# Patient Record
Sex: MALE | Race: BLACK OR AFRICAN AMERICAN
[De-identification: names, ages, dates, MRNs, and addresses within clinical notes are randomized per-mention and may not be internally consistent; named-entity substitution may affect disease eponyms.]

---

## 2018-10-09 ENCOUNTER — HOSPITAL ENCOUNTER (EMERGENCY)
Dept: HOSPITAL 47 - EC | Age: 28
Discharge: HOME | End: 2018-10-09
Payer: COMMERCIAL

## 2018-10-09 VITALS
SYSTOLIC BLOOD PRESSURE: 125 MMHG | RESPIRATION RATE: 17 BRPM | HEART RATE: 87 BPM | TEMPERATURE: 98.4 F | DIASTOLIC BLOOD PRESSURE: 66 MMHG

## 2018-10-09 DIAGNOSIS — Z20.2: Primary | ICD-10-CM

## 2018-10-09 DIAGNOSIS — F17.200: ICD-10-CM

## 2018-10-09 LAB
HYALINE CASTS UR QL AUTO: 1 /LPF (ref 0–2)
PH UR: 5.5 [PH] (ref 5–8)
PROT UR QL: (no result)
RBC UR QL: <1 /HPF (ref 0–5)
SP GR UR: 1.02 (ref 1–1.03)
UROBILINOGEN UR QL STRIP: <2 MG/DL (ref ?–2)
WBC #/AREA URNS HPF: 3 /HPF (ref 0–5)

## 2018-10-09 PROCEDURE — 96372 THER/PROPH/DIAG INJ SC/IM: CPT

## 2018-10-09 PROCEDURE — 87491 CHLMYD TRACH DNA AMP PROBE: CPT

## 2018-10-09 PROCEDURE — 99283 EMERGENCY DEPT VISIT LOW MDM: CPT

## 2018-10-09 PROCEDURE — 81001 URINALYSIS AUTO W/SCOPE: CPT

## 2018-10-09 PROCEDURE — 87591 N.GONORRHOEAE DNA AMP PROB: CPT

## 2018-10-09 NOTE — ED
Male Urogenital HPI





- General


Chief complaint: Urogenital


Stated complaint: check for STDs


Time Seen by Provider: 10/09/18 22:05


Source: patient


Mode of arrival: ambulatory


Limitations: no limitations





- History of Present Illness


Initial comments: 


Heath is a previously healthy 28-year-old male who presents the emergency 

department for evaluation and treatment after his sexual partner advised him 

that she has tested positive for chlamydia.  Patient reports that he's been 

having no symptoms, he denies any penile lesions, pain, discharge.  He denies 

any dysuria or difficulty urinating.  He denies any history of sexual 

transmitted infections.  He denies any history of HIV.








- Related Data


 Home Medications











 Medication  Instructions  Recorded  Confirmed


 


No Known Home Medications  10/09/18 10/09/18











 Allergies











Allergy/AdvReac Type Severity Reaction Status Date / Time


 


No Known Allergies Allergy   Verified 10/09/18 21:59














Review of Systems


ROS Statement: 


Those systems with pertinent positive or pertinent negative responses have been 

documented in the HPI.





ROS Other: All systems not noted in ROS Statement are negative.





Past Medical History


Past Medical History: Asthma


History of Any Multi-Drug Resistant Organisms: None Reported


Past Surgical History: No Surgical Hx Reported


Past Psychological History: No Psychological Hx Reported


Smoking Status: Current every day smoker


Past Alcohol Use History: None Reported


Past Drug Use History: None Reported





General Exam





- General Exam Comments


Initial Comments: 


Physical Exam


GENERAL:


Patient is well-developed and well-nourished.  Patient is nontoxic and well-

hydrated and is in no distress.





HENT:


Normocephalic, Atraumatic. 





EYES:


PERRL, EOMI





PULMONARY:


Unlabored respirations.  





CARDIOVASCULAR:


There is a regular rate and rhythm without any murmurs gallops or rubs.  





ABDOMEN:


Soft and nontender with normal bowel sounds. 





SKIN:


Skin is clear with no lesions or rashes and otherwise unremarkable.





: 


Deferred





NEUROLOGIC:


Patient is alert and oriented x3.  Moving all extremities spontaneously





MUSCULOSKELETAL:


Normal extremities with adequate strength and full range of motion.  No lower 

extremity swelling or edema.  No calf tenderness.  





PSYCHIATRIC:


Normal psychiatric evaluation.  





Limitations: no limitations





Limitations: no limitations





Course





 Vital Signs











  10/09/18





  21:17


 


Temperature 98.4 F


 


Pulse Rate 87


 


Respiratory 17





Rate 


 


Blood Pressure 125/66


 


O2 Sat by Pulse 95





Oximetry 














Medical Decision Making





- Medical Decision Making


Urinalysis, testing and treatment for gonorrhea and chlamydia were ordered


Follow-up was discussed


Patient was discharged home





The patient was advised follow-up with the health department for HIV testing or 

return to the ER if he develops any signs or symptoms of sexual transmitted 

infection.








- Lab Data





 Lab Results











  10/09/18 Range/Units





  21:22 


 


Urine Color  Yellow  


 


Urine Appearance  Clear  (Clear)  


 


Urine pH  5.5  (5.0-8.0)  


 


Ur Specific Gravity  1.016  (1.001-1.035)  


 


Urine Protein  2+ H  (Negative)  


 


Urine Glucose (UA)  Negative  (Negative)  


 


Urine Ketones  Negative  (Negative)  


 


Urine Blood  Negative  (Negative)  


 


Urine Nitrite  Negative  (Negative)  


 


Urine Bilirubin  Negative  (Negative)  


 


Urine Urobilinogen  <2.0  (<2.0)  mg/dL


 


Ur Leukocyte Esterase  Negative  (Negative)  


 


Urine RBC  <1  (0-5)  /hpf


 


Urine WBC  3  (0-5)  /hpf


 


Hyaline Casts  1  (0-2)  /lpf


 


Urine Mucus  Rare H  (None)  /hpf














Disposition


Clinical Impression: 


 Exposure to STD





Disposition: HOME SELF-CARE


Condition: Good


Instructions:  Chlamydia (ED), Gonorrhea (ED), Safe Sex (ED)


Is patient prescribed a controlled substance at d/c from ED?: No


Referrals: 


None,Stated [Primary Care Provider] - 1-2 days


Time of Disposition: 22:11

## 2020-06-30 ENCOUNTER — HOSPITAL ENCOUNTER (EMERGENCY)
Dept: HOSPITAL 47 - EC | Age: 30
Discharge: HOME | End: 2020-06-30
Payer: COMMERCIAL

## 2020-06-30 VITALS
DIASTOLIC BLOOD PRESSURE: 90 MMHG | TEMPERATURE: 99.7 F | HEART RATE: 98 BPM | RESPIRATION RATE: 16 BRPM | SYSTOLIC BLOOD PRESSURE: 140 MMHG

## 2020-06-30 DIAGNOSIS — Z53.29: ICD-10-CM

## 2020-06-30 DIAGNOSIS — S01.511A: Primary | ICD-10-CM

## 2020-06-30 DIAGNOSIS — Z23: ICD-10-CM

## 2020-06-30 DIAGNOSIS — S09.93XA: ICD-10-CM
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
## 2020-06-30 DIAGNOSIS — F17.200: ICD-10-CM

## 2020-06-30 PROCEDURE — 12011 RPR F/E/E/N/L/M 2.5 CM/<: CPT

## 2020-06-30 PROCEDURE — 90715 TDAP VACCINE 7 YRS/> IM: CPT

## 2020-06-30 PROCEDURE — 90471 IMMUNIZATION ADMIN: CPT

## 2020-06-30 PROCEDURE — 96372 THER/PROPH/DIAG INJ SC/IM: CPT

## 2020-06-30 PROCEDURE — 99282 EMERGENCY DEPT VISIT SF MDM: CPT
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
## 2020-10-12 ENCOUNTER — HOSPITAL ENCOUNTER (EMERGENCY)
Dept: HOSPITAL 47 - EC | Age: 30
Discharge: HOME | End: 2020-10-12
Payer: COMMERCIAL

## 2020-10-12 VITALS
DIASTOLIC BLOOD PRESSURE: 91 MMHG | RESPIRATION RATE: 18 BRPM | SYSTOLIC BLOOD PRESSURE: 151 MMHG | HEART RATE: 66 BPM | TEMPERATURE: 97.8 F
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
## 2020-10-12 DIAGNOSIS — F17.200: ICD-10-CM

## 2020-10-12 DIAGNOSIS — S05.00XA: Primary | ICD-10-CM

## 2020-10-12 PROCEDURE — 99282 EMERGENCY DEPT VISIT SF MDM: CPT

## 2020-10-12 NOTE — ED
Eye Problem HPI





- General


Chief complaint: Eye Problems


Stated complaint: Eye Problems


Time Seen by Provider: 10/12/20 00:48


Source: patient


Mode of arrival: ambulatory


Limitations: no limitations





- History of Present Illness


Initial comments: 


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 

did wear safety goggles but reports some a sandwich around the goggles.  Since 

then he's had persistently worsening eye pain.  Patient states he feels like 

there is sand in both of his eyes.  Denies any vision changes but states that he

hasn't been willing to open his eyes for the past 2 hours secondary to the pain.

 He does not wear contacts.











- Related Data


                                  Previous Rx's











 Medication  Instructions  Recorded


 


Ibuprofen [Motrin] 600 mg PO Q8HR PRN #30 tab 06/30/20


 


Penicillin V Potassium [Pen Vee K] 500 mg PO Q6H #40 tablet 06/30/20











                                    Allergies











Allergy/AdvReac Type Severity Reaction Status Date / Time


 


No Known Allergies Allergy   Verified 10/12/20 00:43














Review of Systems


ROS Statement: 


Those systems with pertinent positive or pertinent negative responses have been 

documented in the HPI.





ROS Other: All systems not noted in ROS Statement are negative.





Past Medical History


Past Medical History: Asthma


History of Any Multi-Drug Resistant Organisms: None Reported


Past Surgical History: No Surgical Hx Reported


Past Psychological History: No Psychological Hx Reported


Smoking Status: Current every day smoker


Past Alcohol Use History: Occasional


Past Drug Use History: Marijuana





General Exam





- General Exam Comments


Initial Comments: 


Physical Exam


GENERAL:


Patient is well-developed and well-nourished.  


Patient is nontoxic and well-hydrated and is in no distress.





HENT:


Normocephalic, Atraumatic. 





EYES:


PERRL


Foreseen staining of the eyes reveals diffuse uptake bilaterally consistent with

a iritis


Negative Suhas sign, no obvious ulcers





PULMONARY:


Unlabored respirations.  





CARDIOVASCULAR:


RRR


Warm and well perfused extremities





ABDOMEN:


Non-distended





SKIN:


No rashes or bruising 





: 


Deferred





NEUROLOGIC:


Alert and oriented


Normal speech


Normal gait





MUSCULOSKELETAL:


Moving all extremities with no apparent injury 





PSYCHIATRIC:


No SI/HI





Limitations: no limitations





Course


                                   Vital Signs











  10/12/20





  00:40


 


Temperature 97.8 F


 


Pulse Rate 66


 


Respiratory 18





Rate 


 


Blood Pressure 151/91


 


O2 Sat by Pulse 100





Oximetry 














Medical Decision Making





- Medical Decision Making


The patient was seen and evaluated history is obtained from patient


Patient's eyes were anesthetized with proparacaine drops patient reported 

significant improvement in his discomfort after this





Despite reporting resolution of his discomfort patient was still minimally 

participatory in exam


For seen staining reveals diffuse uptake bilaterally no obvious ulcers negative 

Suhas sign





Patient will be treated with erythromycin ointment, the patient and his 

girlfriend were advised that he needs to follow Dr. Luevano tomorrow for 

reevaluation


Return parameters were discussed patient was discharged home in stable condition








Disposition


Clinical Impression: 


 Corneal abrasion





Disposition: HOME SELF-CARE


Condition: Stable


Instructions (If sedation given, give patient instructions):  Corneal Abrasion 

(ED)


Is patient prescribed a controlled substance at d/c from ED?: No


Referrals: 


None,Stated [Primary Care Provider] - 1-2 days


Tyree Luevano MD [STAFF PHYSICIAN] - 1-2 days

## 2020-12-24 ENCOUNTER — HOSPITAL ENCOUNTER (EMERGENCY)
Dept: HOSPITAL 47 - EC | Age: 30
Discharge: HOME | End: 2020-12-24
Payer: COMMERCIAL

## 2020-12-24 VITALS
SYSTOLIC BLOOD PRESSURE: 154 MMHG | RESPIRATION RATE: 18 BRPM | DIASTOLIC BLOOD PRESSURE: 98 MMHG | HEART RATE: 79 BPM | TEMPERATURE: 98.3 F

## 2020-12-24 DIAGNOSIS — R10.817: ICD-10-CM

## 2020-12-24 DIAGNOSIS — R10.9: ICD-10-CM

## 2020-12-24 DIAGNOSIS — R14.0: ICD-10-CM

## 2020-12-24 DIAGNOSIS — N28.9: Primary | ICD-10-CM

## 2020-12-24 DIAGNOSIS — F17.200: ICD-10-CM

## 2020-12-24 LAB
ALBUMIN SERPL-MCNC: 4.2 G/DL (ref 3.5–5)
ALP SERPL-CCNC: 95 U/L (ref 38–126)
ALT SERPL-CCNC: 88 U/L (ref 4–49)
ANION GAP SERPL CALC-SCNC: 6 MMOL/L
AST SERPL-CCNC: 59 U/L (ref 17–59)
BASOPHILS # BLD AUTO: 0 K/UL (ref 0–0.2)
BASOPHILS NFR BLD AUTO: 1 %
BUN SERPL-SCNC: 21 MG/DL (ref 9–20)
CALCIUM SPEC-MCNC: 9.6 MG/DL (ref 8.4–10.2)
CHLORIDE SERPL-SCNC: 103 MMOL/L (ref 98–107)
CO2 SERPL-SCNC: 27 MMOL/L (ref 22–30)
EOSINOPHIL # BLD AUTO: 0.1 K/UL (ref 0–0.7)
EOSINOPHIL NFR BLD AUTO: 5 %
ERYTHROCYTE [DISTWIDTH] IN BLOOD BY AUTOMATED COUNT: 4.84 M/UL (ref 4.3–5.9)
ERYTHROCYTE [DISTWIDTH] IN BLOOD: 13.3 % (ref 11.5–15.5)
GLUCOSE SERPL-MCNC: 99 MG/DL (ref 74–99)
HCT VFR BLD AUTO: 46.1 % (ref 39–53)
HGB BLD-MCNC: 16.6 GM/DL (ref 13–17.5)
LIPASE SERPL-CCNC: 191 U/L (ref 23–300)
LYMPHOCYTES # SPEC AUTO: 1.1 K/UL (ref 1–4.8)
LYMPHOCYTES NFR SPEC AUTO: 38 %
MCH RBC QN AUTO: 34.4 PG (ref 25–35)
MCHC RBC AUTO-ENTMCNC: 36.1 G/DL (ref 31–37)
MCV RBC AUTO: 95.3 FL (ref 80–100)
MONOCYTES # BLD AUTO: 0.3 K/UL (ref 0–1)
MONOCYTES NFR BLD AUTO: 9 %
NEUTROPHILS # BLD AUTO: 1.3 K/UL (ref 1.3–7.7)
NEUTROPHILS NFR BLD AUTO: 44 %
PH UR: 6 [PH] (ref 5–8)
PLATELET # BLD AUTO: 214 K/UL (ref 150–450)
POTASSIUM SERPL-SCNC: 4 MMOL/L (ref 3.5–5.1)
PROT SERPL-MCNC: 7.2 G/DL (ref 6.3–8.2)
PROT UR QL: (no result)
RBC UR QL: 1 /HPF (ref 0–5)
SODIUM SERPL-SCNC: 136 MMOL/L (ref 137–145)
SP GR UR: 1.03 (ref 1–1.03)
UROBILINOGEN UR QL STRIP: 2 MG/DL (ref ?–2)
WBC # BLD AUTO: 3 K/UL (ref 3.8–10.6)
WBC # UR AUTO: 1 /HPF (ref 0–5)

## 2020-12-24 PROCEDURE — 99284 EMERGENCY DEPT VISIT MOD MDM: CPT

## 2020-12-24 PROCEDURE — 81001 URINALYSIS AUTO W/SCOPE: CPT

## 2020-12-24 PROCEDURE — 96360 HYDRATION IV INFUSION INIT: CPT

## 2020-12-24 PROCEDURE — 80053 COMPREHEN METABOLIC PANEL: CPT

## 2020-12-24 PROCEDURE — 83605 ASSAY OF LACTIC ACID: CPT

## 2020-12-24 PROCEDURE — 83690 ASSAY OF LIPASE: CPT

## 2020-12-24 PROCEDURE — 36415 COLL VENOUS BLD VENIPUNCTURE: CPT

## 2020-12-24 PROCEDURE — 85025 COMPLETE CBC W/AUTO DIFF WBC: CPT

## 2020-12-24 PROCEDURE — 74018 RADEX ABDOMEN 1 VIEW: CPT

## 2020-12-24 NOTE — XR
EXAM:

  XR Abdomen, 2 Views

 

CLINICAL HISTORY:

  Abdominal pain.

 

TECHNIQUE:

  Frontal view of the abdomen/pelvis with upright view of the abdomen.

 

COMPARISON:

  No relevant prior studies available.

 

FINDINGS:

No evidence of small bowel obstruction or free intraperitoneal air.

 

No organomegaly or abnormal mass-effect.

 

No pathologic calcifications.  No acute abnormalities noted of the bones.

 

IMPRESSION:     

Negative abdominal x-rays.

## 2020-12-24 NOTE — ED
Abdominal Pain HPI





- General


Chief Complaint: Abdominal Pain


Stated Complaint: Constipation


Time Seen by Provider: 12/24/20 00:29


Source: patient


Mode of arrival: ambulatory


Limitations: no limitations





- History of Present Illness


Initial Comments: 


30-year-old male patient presents to the emergency department today for 

evaluation of abdominal pain and bloating.  Patient states his been having 

issues with constipation over the last couple of weeks.  Patient states he did 

take powder laxative yesterday did have a small bowel movement this morning 

which did relieve some pressure but states the pressure built back up throughout

the day. He denies any nausea or vomiting. States he is able to eat. Denies any 

fever or chills. Denies history of abdominal surgery.  Patient denies any recent

rash, fever, chills, cough, shortness of breath, chest pain, back pain, 

numbness, tingling, dizziness, weakness, hematuria, dysuria, urinary urgency, 

urinary frequency, headache, visual changes, or any other complaints.





- Related Data


                                  Previous Rx's











 Medication  Instructions  Recorded


 


Ibuprofen [Motrin] 600 mg PO Q8HR PRN #30 tab 06/30/20


 


Penicillin V Potassium [Pen Vee K] 500 mg PO Q6H #40 tablet 06/30/20











                                    Allergies











Allergy/AdvReac Type Severity Reaction Status Date / Time


 


No Known Allergies Allergy   Verified 12/24/20 00:28














Review of Systems


ROS Statement: 


Those systems with pertinent positive or pertinent negative responses have been 

documented in the HPI.





ROS Other: All systems not noted in ROS Statement are negative.





Past Medical History


Past Medical History: Asthma


History of Any Multi-Drug Resistant Organisms: None Reported


Past Surgical History: No Surgical Hx Reported


Past Psychological History: No Psychological Hx Reported


Smoking Status: Current every day smoker


Past Alcohol Use History: Occasional


Past Drug Use History: Marijuana





General Exam


Limitations: no limitations


General appearance: alert, in no apparent distress, other (Physical well-

developed, well-nourished adult male patient in no acute distress.  Vital signs 

upon presentation temperature 98.3F, pulse 79, respirations 18, blood pressure 

154/98, pulse ox 98% on room air.)


Respiratory exam: Present: normal lung sounds bilaterally.  Absent: respiratory 

distress, wheezes, rales, rhonchi, stridor


Cardiovascular Exam: Present: regular rate, normal rhythm, normal heart sounds. 

Absent: systolic murmur, diastolic murmur, rubs, gallop, clicks


GI/Abdominal exam: Present: soft, distended, normal bowel sounds.  Absent: 

tenderness, guarding, rebound, rigid


Neurological exam: Present: alert, oriented X3, CN II-XII intact


Psychiatric exam: Present: normal affect, normal mood


Skin exam: Present: warm, dry, intact, normal color.  Absent: rash





Course


                                   Vital Signs











  12/24/20





  00:24


 


Temperature 98.3 F


 


Pulse Rate 79


 


Respiratory 18





Rate 


 


Blood Pressure 154/98


 


O2 Sat by Pulse 98





Oximetry 














Medical Decision Making





- Medical Decision Making


30-year-old male patient presented to the emergency department today with 

complaints of abdominal pain and bloating.  Patient states he hasn't had a good 

bowel movement for the last 2 weeks.  Physical examination did reveal mild 

generalized abdominal tenderness and distention.  Labs reviewed and revealed 

white blood cell count at 3.0.  BUN 21, creatinine 1.57.  There was protein in 

the urine.  I did discuss findings and results with the patient.  We did discuss

his renal function and that he should have this redrawn by his primary care 

physician and have further evaluation if his numbers do not improve.  A primary 

care physician was recommended to him.  He is given a bottle of magnesium 

citrate to aid with bowel movements.  He is instructed to increase fluid, 

physical activity and fibrous diet.  Return parameters were discussed in detail.

 He verbalizes understanding and agrees with this plan.








- Lab Data


Result diagrams: 


                                 12/24/20 00:50





                                 12/24/20 00:50


                                   Lab Results











  12/24/20 12/24/20 12/24/20 Range/Units





  00:50 00:50 00:50 


 


WBC  3.0 L    (3.8-10.6)  k/uL


 


RBC  4.84    (4.30-5.90)  m/uL


 


Hgb  16.6    (13.0-17.5)  gm/dL


 


Hct  46.1    (39.0-53.0)  %


 


MCV  95.3    (80.0-100.0)  fL


 


MCH  34.4    (25.0-35.0)  pg


 


MCHC  36.1    (31.0-37.0)  g/dL


 


RDW  13.3    (11.5-15.5)  %


 


Plt Count  214    (150-450)  k/uL


 


MPV  7.4    


 


Neutrophils %  44    %


 


Lymphocytes %  38    %


 


Monocytes %  9    %


 


Eosinophils %  5    %


 


Basophils %  1    %


 


Neutrophils #  1.3    (1.3-7.7)  k/uL


 


Lymphocytes #  1.1    (1.0-4.8)  k/uL


 


Monocytes #  0.3    (0-1.0)  k/uL


 


Eosinophils #  0.1    (0-0.7)  k/uL


 


Basophils #  0.0    (0-0.2)  k/uL


 


Sodium   136 L   (137-145)  mmol/L


 


Potassium   4.0   (3.5-5.1)  mmol/L


 


Chloride   103   ()  mmol/L


 


Carbon Dioxide   27   (22-30)  mmol/L


 


Anion Gap   6   mmol/L


 


BUN   21 H   (9-20)  mg/dL


 


Creatinine   1.57 H   (0.66-1.25)  mg/dL


 


Est GFR (CKD-EPI)AfAm   69   (>60 ml/min/1.73 sqM)  


 


Est GFR (CKD-EPI)NonAf   59   (>60 ml/min/1.73 sqM)  


 


Glucose   99   (74-99)  mg/dL


 


Plasma Lactic Acid Liborio    0.7  (0.7-2.0)  mmol/L


 


Calcium   9.6   (8.4-10.2)  mg/dL


 


Total Bilirubin   0.7   (0.2-1.3)  mg/dL


 


AST   59   (17-59)  U/L


 


ALT   88 H   (4-49)  U/L


 


Alkaline Phosphatase   95   ()  U/L


 


Total Protein   7.2   (6.3-8.2)  g/dL


 


Albumin   4.2   (3.5-5.0)  g/dL


 


Lipase   191   ()  U/L


 


Urine Color     


 


Urine Appearance     (Clear)  


 


Urine pH     (5.0-8.0)  


 


Ur Specific Gravity     (1.001-1.035)  


 


Urine Protein     (Negative)  


 


Urine Glucose (UA)     (Negative)  


 


Urine Ketones     (Negative)  


 


Urine Blood     (Negative)  


 


Urine Nitrite     (Negative)  


 


Urine Bilirubin     (Negative)  


 


Urine Urobilinogen     (<2.0)  mg/dL


 


Ur Leukocyte Esterase     (Negative)  


 


Urine RBC     (0-5)  /hpf


 


Urine WBC     (0-5)  /hpf


 


Urine Mucus     (None)  /hpf














  12/24/20 Range/Units





  01:26 


 


WBC   (3.8-10.6)  k/uL


 


RBC   (4.30-5.90)  m/uL


 


Hgb   (13.0-17.5)  gm/dL


 


Hct   (39.0-53.0)  %


 


MCV   (80.0-100.0)  fL


 


MCH   (25.0-35.0)  pg


 


MCHC   (31.0-37.0)  g/dL


 


RDW   (11.5-15.5)  %


 


Plt Count   (150-450)  k/uL


 


MPV   


 


Neutrophils %   %


 


Lymphocytes %   %


 


Monocytes %   %


 


Eosinophils %   %


 


Basophils %   %


 


Neutrophils #   (1.3-7.7)  k/uL


 


Lymphocytes #   (1.0-4.8)  k/uL


 


Monocytes #   (0-1.0)  k/uL


 


Eosinophils #   (0-0.7)  k/uL


 


Basophils #   (0-0.2)  k/uL


 


Sodium   (137-145)  mmol/L


 


Potassium   (3.5-5.1)  mmol/L


 


Chloride   ()  mmol/L


 


Carbon Dioxide   (22-30)  mmol/L


 


Anion Gap   mmol/L


 


BUN   (9-20)  mg/dL


 


Creatinine   (0.66-1.25)  mg/dL


 


Est GFR (CKD-EPI)AfAm   (>60 ml/min/1.73 sqM)  


 


Est GFR (CKD-EPI)NonAf   (>60 ml/min/1.73 sqM)  


 


Glucose   (74-99)  mg/dL


 


Plasma Lactic Acid Liborio   (0.7-2.0)  mmol/L


 


Calcium   (8.4-10.2)  mg/dL


 


Total Bilirubin   (0.2-1.3)  mg/dL


 


AST   (17-59)  U/L


 


ALT   (4-49)  U/L


 


Alkaline Phosphatase   ()  U/L


 


Total Protein   (6.3-8.2)  g/dL


 


Albumin   (3.5-5.0)  g/dL


 


Lipase   ()  U/L


 


Urine Color  Yellow  


 


Urine Appearance  Clear  (Clear)  


 


Urine pH  6.0  (5.0-8.0)  


 


Ur Specific Gravity  1.027  (1.001-1.035)  


 


Urine Protein  3+ H  (Negative)  


 


Urine Glucose (UA)  Negative  (Negative)  


 


Urine Ketones  Negative  (Negative)  


 


Urine Blood  Trace H  (Negative)  


 


Urine Nitrite  Negative  (Negative)  


 


Urine Bilirubin  Negative  (Negative)  


 


Urine Urobilinogen  2.0  (<2.0)  mg/dL


 


Ur Leukocyte Esterase  Negative  (Negative)  


 


Urine RBC  1  (0-5)  /hpf


 


Urine WBC  1  (0-5)  /hpf


 


Urine Mucus  Rare H  (None)  /hpf














- Radiology Data


Radiology results: report reviewed, image reviewed


KUB x-ray was obtained.  Report is reviewed in its entirety.  Impression by Dr. Esparza shows negative abdominal x-rays.





Disposition


Clinical Impression: 


 Abdominal pain, Renal insufficiency





Disposition: HOME SELF-CARE


Condition: Good


Instructions (If sedation given, give patient instructions):  Constipation (ED),

Abdominal Pain (ED)


Additional Instructions: 


Drink one half bottle of mag citrate if he has not had a bowel movement in 12 

hours drinking other half.  Follow-up with the primary care physician for 

recheck in 1-2 days.  Discuss your renal function with them and have repeat 

renal function labs performed.  Return to the emergency department for any new, 

worsening, or concerning symptoms.


Is patient prescribed a controlled substance at d/c from ED?: No


Referrals: 


Neymar Johnson [STAFF PHYSICIAN] - 1-2 days


Time of Disposition: 01:53

## 2022-12-29 ENCOUNTER — HOSPITAL ENCOUNTER (EMERGENCY)
Dept: HOSPITAL 47 - EC | Age: 32
Discharge: HOME | End: 2022-12-29
Payer: COMMERCIAL

## 2022-12-29 VITALS
TEMPERATURE: 98 F | HEART RATE: 100 BPM | RESPIRATION RATE: 20 BRPM | DIASTOLIC BLOOD PRESSURE: 82 MMHG | SYSTOLIC BLOOD PRESSURE: 158 MMHG

## 2022-12-29 DIAGNOSIS — F17.200: ICD-10-CM

## 2022-12-29 DIAGNOSIS — U07.1: Primary | ICD-10-CM

## 2022-12-29 DIAGNOSIS — J45.909: ICD-10-CM

## 2022-12-29 DIAGNOSIS — F12.90: ICD-10-CM

## 2022-12-29 LAB
ALBUMIN SERPL-MCNC: 4.9 G/DL (ref 3.5–5)
ALP SERPL-CCNC: 87 U/L (ref 38–126)
ALT SERPL-CCNC: 58 U/L (ref 4–49)
ANION GAP SERPL CALC-SCNC: 10 MMOL/L
AST SERPL-CCNC: 31 U/L (ref 17–59)
BASOPHILS # BLD AUTO: 0.1 K/UL (ref 0–0.2)
BASOPHILS NFR BLD AUTO: 2 %
BUN SERPL-SCNC: 12 MG/DL (ref 9–20)
CALCIUM SPEC-MCNC: 9.9 MG/DL (ref 8.4–10.2)
CHLORIDE SERPL-SCNC: 105 MMOL/L (ref 98–107)
CO2 SERPL-SCNC: 24 MMOL/L (ref 22–30)
EOSINOPHIL # BLD AUTO: 0.2 K/UL (ref 0–0.7)
EOSINOPHIL NFR BLD AUTO: 2 %
ERYTHROCYTE [DISTWIDTH] IN BLOOD BY AUTOMATED COUNT: 5.06 M/UL (ref 4.3–5.9)
ERYTHROCYTE [DISTWIDTH] IN BLOOD: 12.8 % (ref 11.5–15.5)
GLUCOSE SERPL-MCNC: 141 MG/DL (ref 74–99)
HCT VFR BLD AUTO: 47.7 % (ref 39–53)
HGB BLD-MCNC: 16.8 GM/DL (ref 13–17.5)
LIPASE SERPL-CCNC: 123 U/L (ref 23–300)
LYMPHOCYTES # SPEC AUTO: 0.3 K/UL (ref 1–4.8)
LYMPHOCYTES NFR SPEC AUTO: 4 %
MCH RBC QN AUTO: 33.2 PG (ref 25–35)
MCHC RBC AUTO-ENTMCNC: 35.3 G/DL (ref 31–37)
MCV RBC AUTO: 94.2 FL (ref 80–100)
MONOCYTES # BLD AUTO: 0.7 K/UL (ref 0–1)
MONOCYTES NFR BLD AUTO: 8 %
NEUTROPHILS # BLD AUTO: 6.8 K/UL (ref 1.3–7.7)
NEUTROPHILS NFR BLD AUTO: 83 %
PLATELET # BLD AUTO: 231 K/UL (ref 150–450)
POTASSIUM SERPL-SCNC: 3.8 MMOL/L (ref 3.5–5.1)
PROT SERPL-MCNC: 8.1 G/DL (ref 6.3–8.2)
SODIUM SERPL-SCNC: 139 MMOL/L (ref 137–145)
WBC # BLD AUTO: 8.1 K/UL (ref 3.8–10.6)

## 2022-12-29 PROCEDURE — 87502 INFLUENZA DNA AMP PROBE: CPT

## 2022-12-29 PROCEDURE — 83690 ASSAY OF LIPASE: CPT

## 2022-12-29 PROCEDURE — 36415 COLL VENOUS BLD VENIPUNCTURE: CPT

## 2022-12-29 PROCEDURE — 99284 EMERGENCY DEPT VISIT MOD MDM: CPT

## 2022-12-29 PROCEDURE — 85025 COMPLETE CBC W/AUTO DIFF WBC: CPT

## 2022-12-29 PROCEDURE — 87635 SARS-COV-2 COVID-19 AMP PRB: CPT

## 2022-12-29 PROCEDURE — 74018 RADEX ABDOMEN 1 VIEW: CPT

## 2022-12-29 PROCEDURE — 83605 ASSAY OF LACTIC ACID: CPT

## 2022-12-29 PROCEDURE — 80053 COMPREHEN METABOLIC PANEL: CPT

## 2022-12-29 NOTE — XR
EXAMINATION TYPE: XR KUB

 

DATE OF EXAM: 12/29/2022

 

COMPARISON: 12/24/2020

 

HISTORY: Epigastric pain

 

TECHNIQUE: 2 views

Upper

FINDINGS: There is no sign of intestinal obstruction or pneumoperitoneum. Fecal pattern is normal. No
 evidence of a mass. No calcification seen over the kidneys.

 

IMPRESSION: Nonacute abdomen. No change.

## 2022-12-29 NOTE — ED
Abdominal Pain HPI





- General


Source: patient


Mode of arrival: ambulatory


Limitations: no limitations





<Aura Salas - Last Filed: 12/29/22 17:30>





- General


Source: RN notes reviewed





<Johnna Jacobs - Last Filed: 12/29/22 20:31>





- General


Chief Complaint: Abdominal Pain


Stated Complaint: Back/abd pain





- History of Present Illness


Initial Comments: 


Patient is a 32-year-old male who presents to the emergency department with a 

chief complaint abdominal pain.  Pain started this morning when patient woke up.

 Describes as generalized cramping and bloating with radiation to his lower 

back.  He denies injury to his back.  Patient also reports nausea, headache, 

chills, dry cough.  Denies other URI symptoms, vomiting, diarrhea, blood in 

stool, burning with urination, trouble urinating, blood in urine. No history of 

abdominal surgery. Last bowel movement this morning, nonbloody.  Patient does 

admit to drinking a pint of liquor last night.  States he typically drinks twice

a month which usually includes liquor.  Denies past and present use of tobacco. 

Admits to daily marijuana use.


 (Aura Salas)





- Related Data


                                Home Medications











 Medication  Instructions  Recorded  Confirmed


 


No Known Home Medications  12/29/22 12/29/22











                                    Allergies











Allergy/AdvReac Type Severity Reaction Status Date / Time


 


No Known Allergies Allergy   Verified 12/29/22 20:15














Review of Systems


ROS Other: All systems not noted in ROS Statement are negative.





<Aura Salas - Last Filed: 12/29/22 17:30>


ROS Other: All systems not noted in ROS Statement are negative.





<Johnna Jacobs - Last Filed: 12/29/22 20:31>


ROS Statement: 


Those systems with pertinent positive or pertinent negative responses have been 

documented in the HPI.








Past Medical History


Past Medical History: Asthma


History of Any Multi-Drug Resistant Organisms: None Reported


Past Surgical History: No Surgical Hx Reported


Past Psychological History: No Psychological Hx Reported


Smoking Status: Current every day smoker


Past Alcohol Use History: Occasional


Past Drug Use History: Marijuana





<Aura Salas - Last Filed: 12/29/22 17:30>





General Exam


Limitations: no limitations





<Aura Salas - Last Filed: 12/29/22 17:30>


General appearance: alert, in no apparent distress


Head exam: Present: atraumatic, normocephalic, normal inspection


Eye exam: Present: normal appearance, PERRL, EOMI.  Absent: scleral icterus, 

conjunctival injection, periorbital swelling


ENT exam: Present: normal exam, mucous membranes moist


Neck exam: Present: normal inspection


Respiratory exam: Present: normal lung sounds bilaterally.  Absent: respiratory 

distress, wheezes, rales, rhonchi, stridor


Cardiovascular Exam: Present: regular rate, normal rhythm, normal heart sounds. 

Absent: systolic murmur, diastolic murmur, rubs, gallop, clicks


GI/Abdominal exam: Present: soft, normal bowel sounds.  Absent: distended, 

tenderness, guarding, rebound, rigid


Extremities exam: Present: normal inspection, full ROM, normal capillary refill.

 Absent: tenderness, pedal edema, joint swelling, calf tenderness


Back exam: Present: normal inspection


Neurological exam: Present: alert, oriented X3, CN II-XII intact


Psychiatric exam: Present: normal affect, normal mood


Skin exam: Present: warm, dry, intact, normal color.  Absent: rash





<Johnna Jacobs - Last Filed: 12/29/22 20:31>





Course





<Johnna Jacobs - Last Filed: 12/29/22 20:31>


                                   Vital Signs











  12/29/22





  16:03


 


Temperature 98 F


 


Pulse Rate 100


 


Respiratory 20





Rate 


 


Blood Pressure 158/82


 


O2 Sat by Pulse 100





Oximetry 














- Reevaluation(s)


Reevaluation #1: 


 I reviewed the above note and assumed care of the patient upon room placement i

n the main emergency department. I discussed the results with the patient, all 

questions were addressed. Patient agreeable with plan for discharge. 


12/29/22 20:26


 (Johnna Jacobs)





Medical Decision Making





- Lab Data


Result diagrams: 


                                 12/29/22 18:32





                                 12/29/22 18:32





<Johnna Jacobs - Last Filed: 12/29/22 20:31>





- Medical Decision Making





Was pt. sent in by a medical professional or institution?


@  -Self 


Did you speak to anyone other than the patient for history?  


@  -patient 


Did you review nursing and triage notes? 


@  -Yes, triage notes reviewed and agree 


Were old charts reviewed? 


@  -No 


Differential Diagnosis? 


@  -SBO, COVID, nausea and vomiting 


EKG interpreted by me (3pts min.)?


@  -[none]


X-rays interpreted by me (1pt min.)?


@  no evidence of abdominal process 


CT interpreted by me (1pt min.)?


@  -[none]


U/S interpreted by me (1pt. min.)?


@  -[none]


What testing was considered but not performed? (CT, X-rays, U/S, labs)? Why?


@  [CT, X-rays, U/S, labs? Why?]


What meds were considered but not given? Why?


@  -Pt vital signs stable and remains a-febrile while in the ED 


Did you discuss the management of the patient with other professionals?


@  -I discussed the case with Dr. Livingston who agrees with the plan for discharge 


Did you reconcile home meds?


@  -[none]


Was smoking cessation discussed for >3mins.?


@  -[none]


Was critical care preformed (if so, how long)?


@  -[none]


Were there social determinants of health that impacted care today? How? (Lisa

elessness, low income, unemployed, alcoholism, drug addiction, transportation, 

low edu. Level, literacy, decrease access to med. care, detention, rehab)?


@  -NA


Was there de-escalation of care discussed even if they declined? (Discuss DNR or

withdrawal of care, Hospice)?


@  -NA


What co-morbidities impacted this encounter? (DM, HTN, Smoking, COPD, CAD, 

Cancer, CVA, Hep., AIDS, mental health diagnosis, sleep apnea, morbid obesity)?


@  -NA


Was patient admitted / discharged?


@  -discharged in stable condition  


Undiagnosed new problem with uncertain prognosis?


@  -NO 


Drug Therapy requiring intensive monitoring for toxicity (Heparin, Nitro, 

Insulin, Cardizem)?


@  -NA


Were any procedures done?


@  -NA


Diagnosis/symptom?


@  -COVID19 


Acute, or Chronic, or Acute on Chronic?


@  -acute 


Uncomplicated (without systemic symptoms) or Complicated (systemic symptoms)?


@  -uncomplicated 


Side effects of treatment?


@  -NA 


Exacerbation, Progression, or Severe Exacerbation]


@  -NA 


Poses a threat to life or bodily function?


@  -low likelihood  (Johnna Jacobs)





- Lab Data


                                   Lab Results











  12/29/22 12/29/22 12/29/22 Range/Units





  18:32 18:32 18:32 


 


WBC  8.1    (3.8-10.6)  k/uL


 


RBC  5.06    (4.30-5.90)  m/uL


 


Hgb  16.8    (13.0-17.5)  gm/dL


 


Hct  47.7    (39.0-53.0)  %


 


MCV  94.2    (80.0-100.0)  fL


 


MCH  33.2    (25.0-35.0)  pg


 


MCHC  35.3    (31.0-37.0)  g/dL


 


RDW  12.8    (11.5-15.5)  %


 


Plt Count  231    (150-450)  k/uL


 


MPV  8.1    


 


Neutrophils %  83    %


 


Lymphocytes %  4    %


 


Monocytes %  8    %


 


Eosinophils %  2    %


 


Basophils %  2    %


 


Neutrophils #  6.8    (1.3-7.7)  k/uL


 


Lymphocytes #  0.3 L    (1.0-4.8)  k/uL


 


Monocytes #  0.7    (0-1.0)  k/uL


 


Eosinophils #  0.2    (0-0.7)  k/uL


 


Basophils #  0.1    (0-0.2)  k/uL


 


Sodium   139   (137-145)  mmol/L


 


Potassium   3.8   (3.5-5.1)  mmol/L


 


Chloride   105   ()  mmol/L


 


Carbon Dioxide   24   (22-30)  mmol/L


 


Anion Gap   10   mmol/L


 


BUN   12   (9-20)  mg/dL


 


Creatinine   1.50 H   (0.66-1.25)  mg/dL


 


Est GFR (CKD-EPI)AfAm   72   (>60 ml/min/1.73 sqM)  


 


Est GFR (CKD-EPI)NonAf   62   (>60 ml/min/1.73 sqM)  


 


Glucose   141 H   (74-99)  mg/dL


 


Plasma Lactic Acid Liborio    1.3  (0.7-2.0)  mmol/L


 


Calcium   9.9   (8.4-10.2)  mg/dL


 


Total Bilirubin   0.9   (0.2-1.3)  mg/dL


 


AST   31   (17-59)  U/L


 


ALT   58 H   (4-49)  U/L


 


Alkaline Phosphatase   87   ()  U/L


 


Total Protein   8.1   (6.3-8.2)  g/dL


 


Albumin   4.9   (3.5-5.0)  g/dL


 


Lipase   123   ()  U/L


 


Coronavirus (PCR)     (Not Detectd)  


 


Influenza Type A RNA     (Not Detectd)  


 


Influenza Type B (PCR)     (Not Detectd)  














  12/29/22 12/29/22 Range/Units





  18:32 18:32 


 


WBC    (3.8-10.6)  k/uL


 


RBC    (4.30-5.90)  m/uL


 


Hgb    (13.0-17.5)  gm/dL


 


Hct    (39.0-53.0)  %


 


MCV    (80.0-100.0)  fL


 


MCH    (25.0-35.0)  pg


 


MCHC    (31.0-37.0)  g/dL


 


RDW    (11.5-15.5)  %


 


Plt Count    (150-450)  k/uL


 


MPV    


 


Neutrophils %    %


 


Lymphocytes %    %


 


Monocytes %    %


 


Eosinophils %    %


 


Basophils %    %


 


Neutrophils #    (1.3-7.7)  k/uL


 


Lymphocytes #    (1.0-4.8)  k/uL


 


Monocytes #    (0-1.0)  k/uL


 


Eosinophils #    (0-0.7)  k/uL


 


Basophils #    (0-0.2)  k/uL


 


Sodium    (137-145)  mmol/L


 


Potassium    (3.5-5.1)  mmol/L


 


Chloride    ()  mmol/L


 


Carbon Dioxide    (22-30)  mmol/L


 


Anion Gap    mmol/L


 


BUN    (9-20)  mg/dL


 


Creatinine    (0.66-1.25)  mg/dL


 


Est GFR (CKD-EPI)AfAm    (>60 ml/min/1.73 sqM)  


 


Est GFR (CKD-EPI)NonAf    (>60 ml/min/1.73 sqM)  


 


Glucose    (74-99)  mg/dL


 


Plasma Lactic Acid Liborio    (0.7-2.0)  mmol/L


 


Calcium    (8.4-10.2)  mg/dL


 


Total Bilirubin    (0.2-1.3)  mg/dL


 


AST    (17-59)  U/L


 


ALT    (4-49)  U/L


 


Alkaline Phosphatase    ()  U/L


 


Total Protein    (6.3-8.2)  g/dL


 


Albumin    (3.5-5.0)  g/dL


 


Lipase    ()  U/L


 


Coronavirus (PCR)   Detected A  (Not Detectd)  


 


Influenza Type A RNA  Not Detected   (Not Detectd)  


 


Influenza Type B (PCR)  Not Detected   (Not Detectd)  














Disposition





<Aura Salas - Last Filed: 12/29/22 17:30>


Is patient prescribed a controlled substance at d/c from ED?: No


Time of Disposition: 20:21





<Johnna Jacobs - Last Filed: 12/29/22 20:31>


Clinical Impression: 


 COVID-19





Disposition: HOME SELF-CARE


Condition: Stable


Additional Instructions: 


Please return to the nearest emergency department if symptoms worsen or persist.


Referrals: 


None,Stated [Primary Care Provider] - 1-2 days

## 2025-03-04 ENCOUNTER — HOSPITAL ENCOUNTER (OUTPATIENT)
Dept: HOSPITAL 47 - RADUSWWP | Age: 35
Discharge: HOME | End: 2025-03-04
Attending: FAMILY MEDICINE
Payer: COMMERCIAL

## 2025-03-04 DIAGNOSIS — N18.2: Primary | ICD-10-CM

## 2025-03-04 DIAGNOSIS — R86.9: ICD-10-CM

## 2025-03-04 PROCEDURE — 76770 US EXAM ABDO BACK WALL COMP: CPT

## 2025-03-04 NOTE — US
EXAMINATION TYPE: US kidneys/renal and bladder

 

DATE OF EXAM: 3/4/2025

 

COMPARISON: [facility] radiograph 12/29/2022

 

CLINICAL INDICATION: Male, 34 years old with history of N18.2 CHRONIC KIDNEY DISEASE, STAGE 2 (MILD);


 

TECHNIQUE: Grayscale imaging of the bilateral kidneys and urinary bladder:

 

FINDINGS:

 

EXAM MEASUREMENTS:

 

Right Kidney:  9.6 x 4.5 x 5.2 cm

Left Kidney: 9.8 x 5.2 x 5.5 cm

 

 

 

Right Kidney: visualized portions wnl, inferior pole limited by overlying bowel gas  

Left Kidney: wnl  

Bladder: wnl

**Bilateral Jets seen: yes

 

 

There is no evidence for hydronephrosis at this point in time.  No nephrolithiasis is seen.  No tal
s are identified. Corticomedullary differentiation is maintained bilaterally. No cortical thinning. T
he urinary bladder is anechoic. 

 

 

 

IMPRESSION:

No hydronephrosis or nephrolithiasis.

 

 

 

[facility], Workstation: [id number], 3/4/2025 10:53 AM